# Patient Record
Sex: FEMALE | Race: BLACK OR AFRICAN AMERICAN | NOT HISPANIC OR LATINO | Employment: FULL TIME | URBAN - METROPOLITAN AREA
[De-identification: names, ages, dates, MRNs, and addresses within clinical notes are randomized per-mention and may not be internally consistent; named-entity substitution may affect disease eponyms.]

---

## 2017-11-03 ENCOUNTER — CLINICAL SUPPORT (OUTPATIENT)
Dept: URGENT CARE | Facility: CLINIC | Age: 37
End: 2017-11-03

## 2017-11-03 DIAGNOSIS — Z23 ENCOUNTER FOR IMMUNIZATION: Primary | ICD-10-CM

## 2017-11-03 PROCEDURE — 86580 TB INTRADERMAL TEST: CPT | Mod: S$GLB,,,

## 2017-11-03 PROCEDURE — 90686 IIV4 VACC NO PRSV 0.5 ML IM: CPT | Mod: S$GLB,,,

## 2017-11-15 LAB
TB INDURATION - 48 HR READ: NORMAL MM
TB INDURATION - 72 HR READ: NORMAL MM
TB SKIN TEST - 48 HR READ: NEGATIVE
TB SKIN TEST - 72 HR READ: NORMAL

## 2018-10-21 ENCOUNTER — HOSPITAL ENCOUNTER (EMERGENCY)
Facility: OTHER | Age: 38
Discharge: HOME | End: 2018-10-21
Attending: EMERGENCY MEDICINE

## 2018-10-21 VITALS
HEART RATE: 78 BPM | WEIGHT: 293 LBS | SYSTOLIC BLOOD PRESSURE: 159 MMHG | RESPIRATION RATE: 14 BRPM | DIASTOLIC BLOOD PRESSURE: 95 MMHG | TEMPERATURE: 98 F | OXYGEN SATURATION: 98 % | HEIGHT: 66 IN | BODY MASS INDEX: 47.09 KG/M2

## 2018-10-21 DIAGNOSIS — S39.012A STRAIN OF LUMBAR REGION, INITIAL ENCOUNTER: Primary | ICD-10-CM
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
## 2018-10-21 DIAGNOSIS — T14.8XXA CONTUSION OF SOFT TISSUE: ICD-10-CM

## 2018-10-21 LAB
B-HCG UR QL: NEGATIVE
CTP QC/QA: YES

## 2018-10-21 PROCEDURE — 99283 EMERGENCY DEPT VISIT LOW MDM: CPT

## 2018-10-21 PROCEDURE — 81025 URINE PREGNANCY TEST: CPT | Performed by: EMERGENCY MEDICINE

## 2018-10-21 PROCEDURE — 25000003 PHARM REV CODE 250: Performed by: PHYSICIAN ASSISTANT

## 2018-10-21 RX ORDER — IBUPROFEN 600 MG/1
600 TABLET ORAL EVERY 6 HOURS PRN
Qty: 20 TABLET | Refills: 0 | Status: SHIPPED | OUTPATIENT
Start: 2018-10-21

## 2018-10-21 RX ORDER — IBUPROFEN 600 MG/1
600 TABLET ORAL
Status: COMPLETED | OUTPATIENT
Start: 2018-10-21 | End: 2018-10-21

## 2018-10-21 RX ADMIN — IBUPROFEN 600 MG: 600 TABLET ORAL at 11:10

## 2018-10-21 NOTE — ED TRIAGE NOTES
Pt presents to ED with c/o bilateral knee, right hip and lower back pain after slip and fall. Pt denies head trauma or LOC. Pt denies numbness or tingling in extemities. DP pulses +2 bilaterally. Full ROM noted to extremities x 4. Pt ambulatory. Pain rated 4/10.

## 2018-10-21 NOTE — ED PROVIDER NOTES
Encounter Date: 10/21/2018       History     Chief Complaint   Patient presents with    Fall     pt states fell on knees and right hip . pt with pain in both knees and right hip and lower back today.pt denies loc     Patient is a 38-year-old female no significant past medical history who presents with complaints of bilateral knee pain and bilateral hip pain after mechanical slip and fall yesterday.  She reports that she was shopping at [employer] when she slipped on water on the floor.  She reports that she was able to get up without any difficulty and had no immediate onset of pain.  She reports upon waking this morning she felt a little bruised and achy.  She reports that she decided to come to the emergency department to get checked out.  She has no difficulty walking or moving.  She denies any bladder or bowel incontinence or saddle anesthesia.  She reports no associated fever, chills, nausea, vomiting, chest pain or shortness of breath.  She has not taken any medications prior to arrival.  She reports no skin bruising or skin abrasions.  She is currently unaccompanied in the ER.          Review of patient's allergies indicates:  No Known Allergies  No past medical history on file.  No past surgical history on file.  No family history on file.  Social History     Tobacco Use    Smoking status: Not on file   Substance Use Topics    Alcohol use: Not on file    Drug use: Not on file     Review of Systems   Constitutional: Negative for fever.   HENT: Negative for sore throat.    Respiratory: Negative for shortness of breath.    Cardiovascular: Negative for chest pain.   Gastrointestinal: Negative for nausea.   Genitourinary: Negative for dysuria.   Musculoskeletal: Negative for back pain.        Bilateral knee pain  Bilateral hip pain  Lower back pain   Skin: Negative for rash.   Neurological: Negative for weakness.   Hematological: Does not bruise/bleed easily.       Physical Exam     Initial Vitals [10/21/18 1031]    BP Pulse Resp Temp SpO2   (!) 173/96 82 18 97.8 °F (36.6 °C) 100 %      MAP       --         Physical Exam    Nursing note and vitals reviewed.  Constitutional: She appears well-developed and well-nourished. She is not diaphoretic. No distress.   Healthy-appearing female in no acute distress or apparent pain. She makes good eye contact, speaks in clear full sentences, smiles and laughs during interview and exam.  She is able to transition from seated position to standing position with ease.  She has no difficulty ambulating.   HENT:   Head: Normocephalic and atraumatic.   Eyes: Conjunctivae and EOM are normal. Pupils are equal, round, and reactive to light. Right eye exhibits no discharge. Left eye exhibits no discharge. No scleral icterus.   Neck: Normal range of motion.   Cardiovascular: Normal rate, regular rhythm, normal heart sounds and intact distal pulses. Exam reveals no gallop and no friction rub.    No murmur heard.  Pulmonary/Chest: Breath sounds normal. She has no wheezes. She has no rhonchi. She has no rales.   Abdominal: Soft. Bowel sounds are normal. There is no tenderness. There is no rebound and no guarding.   Musculoskeletal: Normal range of motion. She exhibits tenderness. She exhibits no edema.   Ankles knees and hips are ranged in palpated aggressively with no reproducible pain.  There is no overlying skin changes.  She does have point localized tenderness to palpation to bilateral lateral thighs without association to greater trochanter or hip bony landmarks.  She is able to tiptoe walk, heel walk and has normal steady gait.  Upper extremities are unremarkable.  No C, T, L midline bony tenderness or crepitus or step-offs.  Right-sided lumbar paraspinal musculature tenderness to palpation with no overlying skin changes.   Lymphadenopathy:     She has no cervical adenopathy.   Neurological: She is alert and oriented to person, place, and time. She has normal strength. No cranial nerve deficit  or sensory deficit. GCS score is 15. GCS eye subscore is 4. GCS verbal subscore is 5. GCS motor subscore is 6.   Skin: Skin is warm. Capillary refill takes less than 2 seconds. No rash and no abscess noted. No erythema.   Psychiatric: She has a normal mood and affect. Her behavior is normal. Thought content normal.         ED Course   Procedures  Labs Reviewed   POCT URINE PREGNANCY          Imaging Results    None          Medical Decision Making:   ED Management:  Urgent evaluation a 38-year-old female who presents with complaints most consistent with soft tissue contusion after mechanical slip and fall yesterday.  She is afebrile, nontoxic appearing, hemodynamically stable. Physical exam outlined above and reveals no obvious abnormalities only slight soft tissue tenderness to palpation to right-sided lumbar paraspinal musculature region.  Bony landmarks are palpated and ranged without any reproducible pain or concern for fracture.  No imaging felt warranted.  No concern for vertebral fracture, cord compression or cauda equina syndrome.  Will offer single dose of ibuprofen here in the emergency department and encouraged her to continue this regimen at home.  She is also educated on symptom management including hydration rest and warm compresses.  She is educated on ED return precautions verbalized understanding.  She is stable for discharge.                      Clinical Impression:   The primary encounter diagnosis was Strain of lumbar region, initial encounter. Diagnoses of Contusion of soft tissue and Fall on same level from slipping, tripping or stumbling, initial encounter were also pertinent to this visit.                             Sandra Chavez PA-C  10/21/18 3935